# Patient Record
Sex: FEMALE | Race: BLACK OR AFRICAN AMERICAN
[De-identification: names, ages, dates, MRNs, and addresses within clinical notes are randomized per-mention and may not be internally consistent; named-entity substitution may affect disease eponyms.]

---

## 2019-08-01 ENCOUNTER — HOSPITAL ENCOUNTER (OUTPATIENT)
Dept: HOSPITAL 35 - MRI | Age: 69
End: 2019-08-01
Attending: INTERNAL MEDICINE
Payer: COMMERCIAL

## 2019-08-01 DIAGNOSIS — M48.061: ICD-10-CM

## 2019-08-01 DIAGNOSIS — M47.26: Primary | ICD-10-CM

## 2019-08-01 DIAGNOSIS — M43.16: ICD-10-CM

## 2019-09-18 ENCOUNTER — HOSPITAL ENCOUNTER (OUTPATIENT)
Dept: HOSPITAL 35 - PAIN | Age: 69
Discharge: HOME | End: 2019-09-18
Payer: COMMERCIAL

## 2019-09-18 VITALS — WEIGHT: 131 LBS | BODY MASS INDEX: 20.56 KG/M2 | HEIGHT: 67.01 IN

## 2019-09-18 VITALS — SYSTOLIC BLOOD PRESSURE: 137 MMHG | DIASTOLIC BLOOD PRESSURE: 59 MMHG

## 2019-09-18 DIAGNOSIS — I10: Primary | ICD-10-CM

## 2019-09-18 DIAGNOSIS — Z88.2: ICD-10-CM

## 2019-09-18 DIAGNOSIS — M53.3: ICD-10-CM

## 2019-09-18 DIAGNOSIS — F17.210: ICD-10-CM

## 2019-09-18 DIAGNOSIS — M19.90: ICD-10-CM

## 2019-09-18 DIAGNOSIS — Z88.8: ICD-10-CM

## 2019-09-18 DIAGNOSIS — Z79.899: ICD-10-CM

## 2019-09-18 DIAGNOSIS — Z88.1: ICD-10-CM

## 2019-09-18 DIAGNOSIS — Z88.0: ICD-10-CM

## 2019-09-18 DIAGNOSIS — M48.00: ICD-10-CM

## 2019-09-18 NOTE — HPC
[facility]
[address]
[place]   [address]                     PAIN MANAGEMENT CONSULTATION  
_______________________________________________________________________________
 
Name:       ALEJANDRO RODRIGUEZ                 Room #:                     [facility].#:      [id number]                       Account #:      [id number]  
Admission:  09/18/19    Attend Phys:    CHER Amor MD    
Discharge:              Date of Birth:  [birth year]  
                                                          Report #: [id number]
                                                                    [id number]   
_______________________________________________________________________________
THIS REPORT FOR:   //name//                          
 
CC: Naren Amor
 
DATE OF SERVICE:  09/18/2019
 
 
CHIEF COMPLAINT:  Low back pain that goes down into the buttocks on both sides
and involves legs and feet.
 
HISTORY:  The patient is a 68-year-old female who has been seen in the pain
clinic because of chronic pain.  She has noticed some increased pain and
discomfort in her right leg.  She is having some difficulty in lifting her right
leg.  Notes that when she goes upstairs this can be problematic.  Notes that
there is pain when she is standing, when she is cooking, and involves her left
side as well as the right.  Also, has some right hip pain and discomfort.  Her
right hip is "going out."  Has some burning discomfort in her right leg.  She
has noted some spasms in her feet and some numbness into her feet.  Pain
involves the L4-L5 area in the past.  She feels that is in a similar pattern
today.
 
ALLERGIES:  PENICILLIN, SULFA, AND CHOCOLATE.
 
CURRENT MEDICATIONS:  Bentyl 20 mg, Protonix 40 mg, Lyrica 50 mg, lisinopril 10
mg, Lipitor 10 mg, Endocet 10/325, Norvasc 5 mg, Lamisil 250 mg, multivitamin,
and Flexeril 10 mg t.i.d.
 
PAIN CLINIC ASSESSMENT/PQRS:
1.  Osteoarthritis.  The patient has some osteoarthritic changes in her back. 
She is not being treated for rheumatoid arthritis.
2.  Height 5 feet 7 inches, weight 131 pounds, BMI is 20.
3.  Vital signs:  Blood pressure 137/59, pulse 82, respiratory rate 14, room air
saturation 100%.
4.  Pain intensity 8/10.
5.  Fall risk.  The patient has not fallen in the last 3 months.
6.  Blood thinner.  The patient is not on a blood thinning medication.
7.  Hypertension.  The patient is being treated for hypertension.
8.  Opioids greater than 6 weeks, _____.
9.  Risk assessment tool, low for opioid risk.
10.  Functional assessment tool 38/70.
11.  Recreational drug use, _____.
12.  Tobacco:  The patient smokes 1 pack of cigarettes per day, has smoked for
many years.
13.  Alcohol:  The patient drinks about 2-3 alcoholic beverages per week.
 
 
 
[place] [address]                     PAIN MANAGEMENT CONSULTATION  
_______________________________________________________________________________
 
Name:       ALEJANDRO RODRIGUEZ                 Room #:                     [facility]#:      [id number]                       Account #:      [id number]  
Admission:  09/18/19    Attend Phys:    CHER Amor MD    
Discharge:              Date of Birth:  [birth year]  
                                                          Report #: [id number]
                                                                    [id number]   
_______________________________________________________________________________
 
LABORATORY DATA:  No new laboratory values are available.  MRI dated 10/10/2016,
grade 1 anterolisthesis of L4/L5.  No significant disk narrowing.
1.  L5-S1 demonstrated moderate facet changes without significant bulging or
spinal stenosis.
2.  L4-L5 moderate bulging is seen with marked facet changes and ligamentum
flavum hypertrophy associated with a grade 1 subluxation.  Severe stenosis is
seen with canal narrowing of 0.4 cm with lateral recess narrowing and
mild-to-moderate bilateral neural foraminal narrowing.
3.  L3-L4 mild bulging is seen with moderate-to-severe facet changes and
moderate ligamentum flavum hypertrophy.  Severe stenosis with canal narrowing of
0.44 cm with some lateral recess narrowing and mild neural foraminal narrowing.
4.  L2-L3 very slight bulging is seen with moderate facet changes and
significant ligamentum flavum hypertrophy.  There does appear to be significant
stenosis with the canal narrowing of 0.5 cm with mild lateral recess narrowing.
 
PHYSICAL EXAMINATION:
GENERAL:  The patient is a well-developed, well-nourished black female, alert
and oriented x 3.  Her affect is appropriate.  Speech is fluent.
HEENT:  Normocephalic, atraumatic.  Extraocular eye muscles intact.  Sclerae
nonicteric.  Mucous membranes are moist.
NECK:  Without adenopathy or JVD.
HEART:  Regular rate.
LUNGS:  Clear to auscultation.
ABDOMEN:  Nontender.
EXTREMITIES:  Upper extremity muscle strength judged to be 5-/5 for the major
muscle groups in the upper extremity.  The patient is without significant
kyphosis, scoliosis.  Lower extremity muscle strength is judged to be 5-/5 for
the major muscle groups in the lower extremity.  The patient complains of pain
and discomfort with prolonged standing and with activity.  Complains of pain
that radiates down into her right hip.  Notes some spasms and numbing in her
feet with prolonged standing.  Has some difficulty lifting her right leg.
 
IMPRESSION:  Spinal stenosis, severe in the lower extremity at L4-L5 with left
and right radicular pain in the L4-L5 dermatomal distribution.
 
RECOMMENDATIONS:  We discussed treatment options with the patient.  Risks and
benefits of an epidural steroid injection were discussed.  The patient's
neurosurgeon had requested that she come to the pain clinic for evaluation and
for facet joint injections.  The patient is aware of the possible complications
of facet joint injection.  It includes infection, worsening of pain, no
improvement in pain and the patient elects to proceed.
 
PROCEDURE NOTE:  The patient was taken to the procedure area.  She was then
assisted in getting on examination table.  The facet joint at L4-L5 was
identified.  A 25-gauge needle was used to anesthetize the skin on the right
 
 
 
[facility]
[address]
[place]   [address]                     PAIN MANAGEMENT CONSULTATION  
_______________________________________________________________________________
 
Name:       ALEJANDRO RODRIGUEZ                 Room #:                     [facility].#:      [id number]                       Account #:      [id number]  
Admission:  09/18/19    Attend Phys:    CHER Amor MD    
Discharge:              Date of Birth:  [birth year]  
                                                          Report #: [id number]
                                                                    [id number]   
_______________________________________________________________________________
side.  A 20-gauge spinal needle was then advanced into the area using
fluoroscopy for guidance.  After appropriate placement, a total of 20 mg of
triamcinolone was injected into the joint area and 20 mg in the surrounding area
with 1 mL of 0.5% bupivacaine.  The contralateral left side was treated in a
like fashion.  The facet at L4-L5 was identified.  A 25-gauge needle was then
used to anesthetize the area.  A 20-gauge spinal needle was then advanced into
the area of the facet.  A total of 20 mg triamcinolone was injected into this
area and additional 20 mg was injected in the surrounding area.  A total of 2 mL
of 0.5% bupivacaine was infiltrated.  The patient tolerated the procedure well. 
There were no complications.  She describes her pain as 2/10 at the time of
discharge.  She will follow up in the future as needed.
 
We would like to thank you for letting us participate in her care.  We hope she
continues to improve.
 
 
 
 
 
 
 
 
 
 
 
 
 
 
 
 
 
 
 
 
 
 
 
 
 
 
 
 
 
 
                         
   By:                               
                   
D: 09/25/19 1836                           _____________________________________
T: 09/26/19 0207                           CHER Amor MD              /SHAINA

## 2019-09-18 NOTE — NUR
Pain Clinic Assessment:
 
1. History of Osteoarthritis:
BACK
   History of Rheumatoid Arthritis:
 
 
2. Height: 5 ft. 7 in. 170.2 cm.
   Weight: 131.0 lb.  oz. 59.421 kg.
   Patient's BMI: 20.5
 
3. Vital Signs:
   BP: 137/59 Pulse: 82 Resp: 14
   Temp:  02 Sat: 100 ECG Mon:
 
4. Pain Intensity: 8
 
5. Fall Risk:
   Dizziness: N  Needs help standing or walking: N
   Fallen in the last 3 months: N
   Fall risk comments:
 
 
6. Patient on Blood Thinner: None
 
7. History of Hypertension: Y
 
8. Opioid Therapy greater than 6 weeks: N
   Opiate Contract Signed:
 
9. Risk Assessment Tool Provided: LOW RISK 0/3
 
10. Functional Assessment Tool: 38/70
 
11. Recreational Drug Use: Never Drug Type:
    Tobacco Use: Current Every Day Smoker Tobacco Type: Cigarettes
       Amount or Packs/day: 1 PACK/DAY How Many Years:
    Alcohol Use: Yes  Frequency: Weekly Quant: 2-3

## 2020-01-29 ENCOUNTER — HOSPITAL ENCOUNTER (OUTPATIENT)
Dept: HOSPITAL 35 - PAIN | Age: 70
End: 2020-01-29
Payer: COMMERCIAL

## 2020-01-29 VITALS — BODY MASS INDEX: 20.72 KG/M2 | HEIGHT: 67.01 IN | WEIGHT: 132 LBS

## 2020-01-29 VITALS — DIASTOLIC BLOOD PRESSURE: 97 MMHG | SYSTOLIC BLOOD PRESSURE: 169 MMHG

## 2020-01-29 DIAGNOSIS — I10: ICD-10-CM

## 2020-01-29 DIAGNOSIS — Z79.899: ICD-10-CM

## 2020-01-29 DIAGNOSIS — M48.061: Primary | ICD-10-CM

## 2020-01-29 DIAGNOSIS — K21.9: ICD-10-CM

## 2020-01-29 DIAGNOSIS — E78.00: ICD-10-CM

## 2020-01-29 NOTE — NUR
Pain Clinic Assessment:
 
1. History of Osteoarthritis:
BACK
   History of Rheumatoid Arthritis:
*
RHEUMATOID
 
2. Height: 5 ft. 7 in. 170.2 cm.
   Weight: 132.0 lb.  oz. 59.875 kg.
   Patient's BMI: 20.7
 
3. Vital Signs:
   BP: 169/97 Pulse: 81 Resp: 18
   Temp:  02 Sat: 100 ECG Mon:
 
4. Pain Intensity: 8
 
5. Fall Risk:
   Dizziness: N  Needs help standing or walking: N
   Fallen in the last 3 months: N
   Fall risk comments:
 
 
6. Patient on Blood Thinner: None
 
7. History of Hypertension: Y
 
8. Opioid Therapy greater than 6 weeks: N
   Opiate Contract Signed:
 
9. Risk Assessment Tool Provided: LOW RISK 0/3
 
10. Functional Assessment Tool: 38/70
 
11. Recreational Drug Use: Never Drug Type:
    Tobacco Use: Current Every Day Smoker Tobacco Type: Cigarettes
       Amount or Packs/day: 1 How Many Years: 50
    Alcohol Use: Yes  Frequency: Weekly Quant: WINE SOCIALLY

## 2020-02-07 NOTE — HPC
[facility]
[address]
[place]   [address]                     PAIN MANAGEMENT CONSULTATION  
_______________________________________________________________________________
 
Name:       ALEJANDRO RODRIGUEZ                 Room #:                     REG [facility] 
MICHAEL#:      [id number]                       Account #:      [id number]  
Admission:  01/29/20    Attend Phys:    HCER Amor MD    
Discharge:              Date of Birth:  [birth year]  
                                                          Report #: [id number]
                                                                    [id number]   
_______________________________________________________________________________
THIS REPORT FOR:  
 
cc:  Naren Fields MD, Mark S. MD Brown,CHER Banegas MD                                            ~
THIS REPORT FOR:   //name//                          
 
CC: Naren Amor
 
DATE OF SERVICE:  01/29/2020
 
 
CHIEF COMPLAINT:  Low back pain, which goes down into both buttocks, leg and
feet.
 
HISTORY:  The patient is a 69-year-old female who has been seen in the Pain
Clinic because of chronic pain.  She has experienced pain that radiates down in
the lower portion of her back.  She is having pain, which she describes as an
8/10.  Over the last 2 years, she has noticed a worsening of her pain.  It
involves both left and right buttocks areas.  It radiates down the posterior
portion of her legs and involves her feet.  There is a burning pain with
numbness and tingling.  There is a prickling, achy sensation.  Notes that the
pain is worse when she is standing, walking and with other activities of daily
living.  Notes that her medications are helpful and she has found heat helpful
as well.  Particularly notes a burning sensation when she is walking stairs. 
She would like to consider an epidural steroid injection in the future.  She has
undergone an injection at the L4-L5 area and feels that this is the same area,
which was problematic in the past.  She would like to proceed with another
injection.
 
ALLERGIES:  PENICILLIN, SULFA, CHOCOLATE.
 
CURRENT MEDICATIONS:  Bentyl 20 mg, Protonix 40 mg, Lyrica 50 mg, lisinopril 10
mg, Lipitor 10 mg, Endocet 10/325, Norvasc 5 mg, Lamisil 250 mg, multivitamins,
Flexeril 10 mg t.i.d.
 
PAIN CLINIC ASSESSMENT AND PQRS:
1. The patient has osteoarthritic changes involving her back.  She is being
followed by rheumatologist.
2. Height 5 feet 7 inches, weight 132 pounds, BMI is 20.
3. Vital signs:  Blood pressure 169/97, pulse 81, respiratory rate 18, room air
saturation 100%.
4. Pain intensity 8/10.
5. Fall history:  The patient has not fallen in the last 3 months.
 
 
 
[facility]
[address] [place] [address]                     PAIN MANAGEMENT CONSULTATION  
_______________________________________________________________________________
 
Name:       ALEJANDRO RODRIGUEZ                 Room #:                     REG [facility]#:      [id number]                       Account #:      [id number]  
Admission:  01/29/20    Attend Phys:    CHER Amor MD    
Discharge:              Date of Birth:  [birth year]  
                                                          Report #: [id number]
                                                                    [id number]   
_______________________________________________________________________________
6. Blood thinner.  The patient is not on a blood thinning medication.
7. Hypertension.  The patient is being treated for hypertension.
8. Opioids greater than 6 weeks.  The patient is receiving her medication from
[facility].  The patient's risk assessment tool, low for opioid use.
9. Functional assessment tool 38 of 70.
10. Recreational drugs:  The patient denies.
11. Tobacco:  The patient smokes cigarettes about 1 pack a day for the last 50
years.  She drinks occasionally wine on a social basis.
 
PHYSICAL EXAMINATION:
GENERAL:  The patient is a well-developed, well-nourished black female, she is
alert and oriented x 3.  Her affect is appropriate.  Speech is fluent.
HEENT:  Normocephalic, atraumatic.  Extraocular eye muscles intact.  Sclerae
nonicteric.  Mucous membranes are moist.
NECK:  Without adenopathy or JVD.
HEART:  Regular rate.
LUNGS:  Clear to auscultation.
ABDOMEN:  Nontender.
EXTREMITIES:  Upper extremity muscle strength is judged to be 5/5 for the major
muscle groups in the upper extremity.  The patient is without significant
scoliosis, kyphosis or lordosis.  Upper extremity muscle strength is
symmetrical.  The patient has pain and discomfort in the lower portion of her
back with pain that is radiating down into the L4-L5 dermatomal distribution. 
Notes pain that radiates in the area of the right hip.  Notes increased pain and
discomfort with prolonged standing.
 
IMPRESSION:
1. Spinal stenosis with radiculopathy, L4-L5 on the left and right in the L4-L5
dermatomal distribution.
2. Hypertension.
3. Hypercholesterolemia.
4. Gastrointestinal problems.
 
RECOMMENDATIONS:  We discussed treatment options with the patient.  Risks and
benefits of an epidural steroid injection were discussed.  They include but are
not limited to infection, worsening of pain, no improvement in pain, nerve
damage, spinal headache and the patient elects to proceed.
 
The patient will return to the Pain Clinic, at which time she would then undergo
an epidural steroid injection.  The patient states that her insurance is
changing on 02/02/2020.  She will reschedule for an epidural steroid injection
at the time when her insurance has been re-negotiated.
 
 
 
 
[facility]
[address] [place]   [address]                     PAIN MANAGEMENT CONSULTATION  
_______________________________________________________________________________
 
Name:       ALEJANDRO RODRIGUEZ                 Room #:                     REG [facility]#:      [id number]                       Account #:      [id number]  
Admission:  01/29/20    Attend Phys:    CHER Amor MD    
Discharge:              Date of Birth:  [birth year]  
                                                          Report #: [id number]
                                                                    [id number]   
_______________________________________________________________________________
We would like to thank you for letting us participate in her care.  We hope she
continues to improve.
 
 
 
 
 
 
 
 
 
 
 
 
 
 
 
 
 
 
 
 
 
 
 
 
 
 
 
 
 
 
 
 
 
 
 
 
 
 
 
 
 
 
  <ELECTRONICALLY SIGNED>
   By: CHER Amor MD            
  02/07/20     0840
D: 02/05/20 1416                           _____________________________________
T: 02/05/20 2153                           CHER Amor MD              /nt

## 2020-11-12 ENCOUNTER — HOSPITAL ENCOUNTER (EMERGENCY)
Dept: HOSPITAL 35 - ER | Age: 70
Discharge: HOME | End: 2020-11-12
Payer: COMMERCIAL

## 2020-11-12 VITALS — BODY MASS INDEX: 21.97 KG/M2 | WEIGHT: 139.99 LBS | HEIGHT: 67 IN

## 2020-11-12 VITALS — SYSTOLIC BLOOD PRESSURE: 117 MMHG | DIASTOLIC BLOOD PRESSURE: 48 MMHG

## 2020-11-12 DIAGNOSIS — Z98.51: ICD-10-CM

## 2020-11-12 DIAGNOSIS — Z88.2: ICD-10-CM

## 2020-11-12 DIAGNOSIS — Z79.899: ICD-10-CM

## 2020-11-12 DIAGNOSIS — F17.210: ICD-10-CM

## 2020-11-12 DIAGNOSIS — S31.104D: Primary | ICD-10-CM

## 2020-11-12 DIAGNOSIS — I10: ICD-10-CM

## 2020-11-12 DIAGNOSIS — Z88.8: ICD-10-CM
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
## 2020-11-12 DIAGNOSIS — Z48.01: ICD-10-CM

## 2020-11-12 DIAGNOSIS — Z88.0: ICD-10-CM

## 2020-11-12 DIAGNOSIS — Z90.711: ICD-10-CM

## 2020-11-12 LAB
ALBUMIN SERPL-MCNC: 3.1 G/DL (ref 3.4–5)
ALT SERPL-CCNC: 12 U/L (ref 30–65)
ANION GAP SERPL CALC-SCNC: 6 MMOL/L (ref 7–16)
AST SERPL-CCNC: 13 U/L (ref 15–37)
BASOPHILS NFR BLD AUTO: 0.2 % (ref 0–2)
BILIRUB DIRECT SERPL-MCNC: 0.1 MG/DL
BILIRUB SERPL-MCNC: 0.4 MG/DL (ref 0.2–1)
BUN SERPL-MCNC: 21 MG/DL (ref 7–18)
CALCIUM SERPL-MCNC: 9.8 MG/DL (ref 8.5–10.1)
CHLORIDE SERPL-SCNC: 104 MMOL/L (ref 98–107)
CO2 SERPL-SCNC: 28 MMOL/L (ref 21–32)
CREAT SERPL-MCNC: 1.6 MG/DL (ref 0.6–1)
EOSINOPHIL NFR BLD: 8.1 % (ref 0–3)
ERYTHROCYTE [DISTWIDTH] IN BLOOD BY AUTOMATED COUNT: 14.7 % (ref 10.5–14.5)
GLUCOSE SERPL-MCNC: 108 MG/DL (ref 74–106)
GRANULOCYTES NFR BLD MANUAL: 63.7 % (ref 36–66)
HCT VFR BLD CALC: 26.7 % (ref 37–47)
HGB BLD-MCNC: 8.8 GM/DL (ref 12–15)
LIPASE: 51 U/L (ref 73–393)
LYMPHOCYTES NFR BLD AUTO: 21 % (ref 24–44)
MCH RBC QN AUTO: 29.7 PG (ref 26–34)
MCHC RBC AUTO-ENTMCNC: 33 G/DL (ref 28–37)
MCV RBC: 90.1 FL (ref 80–100)
MONOCYTES NFR BLD: 7 % (ref 1–8)
NEUTROPHILS # BLD: 5 THOU/UL (ref 1.4–8.2)
PLATELET # BLD: 463 THOU/UL (ref 150–400)
POTASSIUM SERPL-SCNC: 4.5 MMOL/L (ref 3.5–5.1)
PROT SERPL-MCNC: 7.1 G/DL (ref 6.4–8.2)
RBC # BLD AUTO: 2.97 MIL/UL (ref 4.2–5)
SODIUM SERPL-SCNC: 138 MMOL/L (ref 136–145)
WBC # BLD AUTO: 7.9 THOU/UL (ref 4–11)

## 2020-11-18 ENCOUNTER — HOSPITAL ENCOUNTER (OUTPATIENT)
Dept: HOSPITAL 35 - HYPER | Age: 70
End: 2020-11-18
Attending: PREVENTIVE MEDICINE
Payer: COMMERCIAL

## 2020-11-18 DIAGNOSIS — Z81.2: ICD-10-CM

## 2020-11-18 DIAGNOSIS — I10: ICD-10-CM

## 2020-11-18 DIAGNOSIS — G60.3: ICD-10-CM

## 2020-11-18 DIAGNOSIS — Z90.710: ICD-10-CM

## 2020-11-18 DIAGNOSIS — F32.9: ICD-10-CM

## 2020-11-18 DIAGNOSIS — T81.31XA: Primary | ICD-10-CM
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
## 2020-11-18 DIAGNOSIS — L98.492: ICD-10-CM

## 2020-11-18 DIAGNOSIS — Z90.49: ICD-10-CM
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
## 2020-11-18 DIAGNOSIS — Z79.82: ICD-10-CM

## 2020-11-18 DIAGNOSIS — Z87.891: ICD-10-CM

## 2020-11-24 ENCOUNTER — HOSPITAL ENCOUNTER (OUTPATIENT)
Dept: HOSPITAL 35 - HYPER | Age: 70
End: 2020-11-24
Attending: PREVENTIVE MEDICINE
Payer: COMMERCIAL

## 2020-11-24 DIAGNOSIS — T81.31XD: Primary | ICD-10-CM

## 2020-11-24 DIAGNOSIS — Z79.82: ICD-10-CM

## 2020-11-24 DIAGNOSIS — I10: ICD-10-CM

## 2020-11-24 DIAGNOSIS — L98.492: ICD-10-CM
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
## 2020-11-24 DIAGNOSIS — Z90.710: ICD-10-CM

## 2020-11-24 DIAGNOSIS — Z81.2: ICD-10-CM

## 2020-11-24 DIAGNOSIS — F32.9: ICD-10-CM

## 2020-11-24 DIAGNOSIS — Z98.51: ICD-10-CM

## 2020-11-24 DIAGNOSIS — G60.3: ICD-10-CM

## 2020-11-24 DIAGNOSIS — Z87.891: ICD-10-CM

## 2020-12-02 ENCOUNTER — HOSPITAL ENCOUNTER (OUTPATIENT)
Dept: HOSPITAL 35 - HYPER | Age: 70
End: 2020-12-02
Attending: PREVENTIVE MEDICINE
Payer: COMMERCIAL

## 2020-12-02 DIAGNOSIS — G60.3: ICD-10-CM

## 2020-12-02 DIAGNOSIS — T81.31XD: Primary | ICD-10-CM

## 2020-12-02 DIAGNOSIS — Z87.891: ICD-10-CM
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
## 2020-12-02 DIAGNOSIS — F32.9: ICD-10-CM

## 2020-12-02 DIAGNOSIS — L98.492: ICD-10-CM

## 2020-12-02 DIAGNOSIS — Z79.82: ICD-10-CM

## 2020-12-02 DIAGNOSIS — Z81.2: ICD-10-CM

## 2020-12-16 ENCOUNTER — HOSPITAL ENCOUNTER (OUTPATIENT)
Dept: HOSPITAL 35 - HYPER | Age: 70
End: 2020-12-16
Attending: PREVENTIVE MEDICINE
Payer: COMMERCIAL

## 2020-12-16 DIAGNOSIS — Z81.2: ICD-10-CM

## 2020-12-16 DIAGNOSIS — T81.31XD: Primary | ICD-10-CM

## 2020-12-16 DIAGNOSIS — G60.3: ICD-10-CM
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
## 2020-12-16 DIAGNOSIS — Z87.891: ICD-10-CM

## 2020-12-16 DIAGNOSIS — F32.9: ICD-10-CM

## 2020-12-16 DIAGNOSIS — L98.495: ICD-10-CM

## 2020-12-30 ENCOUNTER — HOSPITAL ENCOUNTER (OUTPATIENT)
Dept: HOSPITAL 35 - HYPER | Age: 70
End: 2020-12-30
Attending: PREVENTIVE MEDICINE
Payer: COMMERCIAL

## 2020-12-30 DIAGNOSIS — G60.3: ICD-10-CM

## 2020-12-30 DIAGNOSIS — I10: ICD-10-CM

## 2020-12-30 DIAGNOSIS — Z87.891: ICD-10-CM

## 2020-12-30 DIAGNOSIS — F32.9: ICD-10-CM
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
## 2020-12-30 DIAGNOSIS — Z90.710: ICD-10-CM

## 2020-12-30 DIAGNOSIS — L98.495: ICD-10-CM

## 2020-12-30 DIAGNOSIS — Z81.2: ICD-10-CM

## 2020-12-30 DIAGNOSIS — T81.31XD: Primary | ICD-10-CM

## 2021-01-13 ENCOUNTER — HOSPITAL ENCOUNTER (OUTPATIENT)
Dept: HOSPITAL 35 - HYPER | Age: 71
End: 2021-01-13
Attending: PREVENTIVE MEDICINE
Payer: COMMERCIAL

## 2021-01-13 DIAGNOSIS — L98.495: ICD-10-CM

## 2021-01-13 DIAGNOSIS — G60.3: ICD-10-CM

## 2021-01-13 DIAGNOSIS — I10: ICD-10-CM

## 2021-01-13 DIAGNOSIS — Z87.891: ICD-10-CM

## 2021-01-13 DIAGNOSIS — Z81.2: ICD-10-CM
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
## 2021-01-13 DIAGNOSIS — F32.9: ICD-10-CM

## 2021-01-13 DIAGNOSIS — T81.31XD: Primary | ICD-10-CM

## 2021-05-24 ENCOUNTER — HOSPITAL ENCOUNTER (OUTPATIENT)
Dept: HOSPITAL 35 - HYPER | Age: 71
End: 2021-05-24
Attending: PREVENTIVE MEDICINE
Payer: COMMERCIAL

## 2021-05-24 DIAGNOSIS — Z90.49: ICD-10-CM

## 2021-05-24 DIAGNOSIS — G89.29: ICD-10-CM

## 2021-05-24 DIAGNOSIS — F17.290: ICD-10-CM

## 2021-05-24 DIAGNOSIS — Z90.710: ICD-10-CM

## 2021-05-24 DIAGNOSIS — S31.104D: Primary | ICD-10-CM

## 2021-05-24 DIAGNOSIS — G60.3: ICD-10-CM

## 2021-05-24 DIAGNOSIS — Z81.2: ICD-10-CM

## 2021-05-24 DIAGNOSIS — Z79.82: ICD-10-CM

## 2021-05-24 DIAGNOSIS — I10: ICD-10-CM
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
## 2021-05-24 DIAGNOSIS — F32.9: ICD-10-CM

## 2021-05-24 DIAGNOSIS — Z79.899: ICD-10-CM

## 2021-06-04 ENCOUNTER — HOSPITAL ENCOUNTER (OUTPATIENT)
Dept: HOSPITAL 35 - SJCVCIMAG | Age: 71
End: 2021-06-04
Attending: PREVENTIVE MEDICINE
Payer: COMMERCIAL

## 2021-06-04 DIAGNOSIS — I73.9: Primary | ICD-10-CM

## 2021-06-04 DIAGNOSIS — M79.604: ICD-10-CM

## 2021-06-04 DIAGNOSIS — M79.605: ICD-10-CM

## 2021-11-20 ENCOUNTER — HOSPITAL ENCOUNTER (EMERGENCY)
Dept: HOSPITAL 35 - ER | Age: 71
Discharge: HOME | End: 2021-11-20
Payer: COMMERCIAL

## 2021-11-20 VITALS — SYSTOLIC BLOOD PRESSURE: 173 MMHG | DIASTOLIC BLOOD PRESSURE: 89 MMHG

## 2021-11-20 VITALS — WEIGHT: 139 LBS | HEIGHT: 66 IN | BODY MASS INDEX: 22.34 KG/M2
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
## 2021-11-20 DIAGNOSIS — M54.2: ICD-10-CM

## 2021-11-20 DIAGNOSIS — I10: ICD-10-CM

## 2021-11-20 DIAGNOSIS — I25.2: ICD-10-CM

## 2021-11-20 DIAGNOSIS — M62.838: Primary | ICD-10-CM
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
## 2021-11-20 DIAGNOSIS — Z90.710: ICD-10-CM
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
## 2021-11-20 DIAGNOSIS — Z79.891: ICD-10-CM

## 2021-11-20 DIAGNOSIS — Z79.899: ICD-10-CM

## 2021-11-20 DIAGNOSIS — F17.210: ICD-10-CM
